# Patient Record
Sex: FEMALE | Race: ASIAN | NOT HISPANIC OR LATINO | URBAN - METROPOLITAN AREA
[De-identification: names, ages, dates, MRNs, and addresses within clinical notes are randomized per-mention and may not be internally consistent; named-entity substitution may affect disease eponyms.]

---

## 2024-09-22 ENCOUNTER — EMERGENCY (EMERGENCY)
Facility: HOSPITAL | Age: 24
LOS: 0 days | Discharge: HOME | End: 2024-09-22
Attending: EMERGENCY MEDICINE
Payer: COMMERCIAL

## 2024-09-22 VITALS
OXYGEN SATURATION: 99 % | HEART RATE: 117 BPM | HEIGHT: 67 IN | WEIGHT: 147.71 LBS | SYSTOLIC BLOOD PRESSURE: 146 MMHG | RESPIRATION RATE: 16 BRPM | TEMPERATURE: 98 F | DIASTOLIC BLOOD PRESSURE: 68 MMHG
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
## 2024-09-22 DIAGNOSIS — S06.0X0A CONCUSSION WITHOUT LOSS OF CONSCIOUSNESS, INITIAL ENCOUNTER: ICD-10-CM

## 2024-09-22 DIAGNOSIS — R51.9 HEADACHE, UNSPECIFIED: ICD-10-CM
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
## 2024-09-22 DIAGNOSIS — S70.11XA CONTUSION OF RIGHT THIGH, INITIAL ENCOUNTER: ICD-10-CM

## 2024-09-22 PROCEDURE — 99284 EMERGENCY DEPT VISIT MOD MDM: CPT

## 2024-09-22 RX ORDER — ACETAMINOPHEN 325 MG/1
650 TABLET ORAL ONCE
Refills: 0 | Status: COMPLETED | OUTPATIENT
Start: 2024-09-22 | End: 2024-09-22

## 2024-09-22 RX ORDER — IBUPROFEN 600 MG
600 TABLET ORAL ONCE
Refills: 0 | Status: COMPLETED | OUTPATIENT
Start: 2024-09-22 | End: 2024-09-22

## 2024-09-22 RX ADMIN — Medication 600 MILLIGRAM(S): at 16:26

## 2024-09-22 RX ADMIN — Medication 600 MILLIGRAM(S): at 16:39

## 2024-09-22 RX ADMIN — ACETAMINOPHEN 650 MILLIGRAM(S): 325 TABLET ORAL at 16:39

## 2024-09-22 RX ADMIN — ACETAMINOPHEN 650 MILLIGRAM(S): 325 TABLET ORAL at 16:26

## 2024-09-22 NOTE — ED PROVIDER NOTE - PATIENT PORTAL LINK FT
You can access the FollowMyHealth Patient Portal offered by [facility] by registering at the following website: [email or web address]. By joining [facility]’s FollowMyHealth portal, you will also be able to view your health information using other applications (apps) compatible with our system.

## 2024-09-22 NOTE — ED ADULT NURSE NOTE - CHPI ED NUR SYMPTOMS NEG
no abrasion/no back pain/no blurred vision/no change in level of consciousness/no chest pain/no chest wall tenderness/no loss of consciousness/no seizure/no vomiting/no weakness
Home

## 2024-09-22 NOTE — ED ADULT NURSE NOTE - NSFALLUNIVINTERV_ED_ALL_ED
Bed/Stretcher in lowest position, wheels locked, appropriate side rails in place/Call bell, personal items and telephone in reach/Instruct patient to call for assistance before getting out of bed/chair/stretcher/Non-slip footwear applied when patient is off stretcher/Mayer to call system/Physically safe environment - no spills, clutter or unnecessary equipment/Purposeful proactive rounding/Room/bathroom lighting operational, light cord in reach
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
## 2024-09-22 NOTE — ED PROVIDER NOTE - NSFOLLOWUPCLINICS_GEN_ALL_ED_FT
[facility]  Neurology  [address] [place] [address]  Phone: [phone]  Fax: [phone]    [facility]  Neurology  [address] [place] [id number]  Phone: [phone]  Fax:     [facility]  Neurology  [address] [place] [address]  Phone: [phone]  Fax: [phone]
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
## 2024-09-22 NOTE — ED PROVIDER NOTE - CHPI ED SYMPTOMS NEG
no back pain/no blurred vision/no chest pain/no loss of consciousness/no seizure/no vomiting/no weakness

## 2024-09-22 NOTE — ED PROVIDER NOTE - SKIN, MLM
5
Skin normal color for race, warm, dry and intact. There is a bruise above the right eye and on the inner right thigh. There is a small hematoma on the left parietal scalp.
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
## 2025-07-09 ENCOUNTER — EMERGENCY (EMERGENCY)
Facility: HOSPITAL | Age: 25
LOS: 0 days | Discharge: HOME | End: 2025-07-09
Attending: STUDENT IN AN ORGANIZED HEALTH CARE EDUCATION/TRAINING PROGRAM
Payer: COMMERCIAL

## 2025-07-09 VITALS — HEIGHT: 68 IN | OXYGEN SATURATION: 99 % | HEART RATE: 74 BPM | WEIGHT: 147.71 LBS | RESPIRATION RATE: 18 BRPM

## 2025-07-09 VITALS
OXYGEN SATURATION: 100 % | DIASTOLIC BLOOD PRESSURE: 79 MMHG | RESPIRATION RATE: 18 BRPM | HEART RATE: 90 BPM | SYSTOLIC BLOOD PRESSURE: 130 MMHG

## 2025-07-09 DIAGNOSIS — Z23 ENCOUNTER FOR IMMUNIZATION: ICD-10-CM

## 2025-07-09 DIAGNOSIS — K08.89 OTHER SPECIFIED DISORDERS OF TEETH AND SUPPORTING STRUCTURES: ICD-10-CM
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
## 2025-07-09 PROCEDURE — 99284 EMERGENCY DEPT VISIT MOD MDM: CPT

## 2025-07-09 RX ORDER — AMOXICILLIN AND CLAVULANATE POTASSIUM 500; 125 MG/1; MG/1
1 TABLET, FILM COATED ORAL ONCE
Refills: 0 | Status: COMPLETED | OUTPATIENT
Start: 2025-07-09 | End: 2025-07-09

## 2025-07-09 RX ADMIN — AMOXICILLIN AND CLAVULANATE POTASSIUM 1 TABLET(S): 500; 125 TABLET, FILM COATED ORAL at 22:24

## 2025-07-09 NOTE — ED ADULT TRIAGE NOTE - CHIEF COMPLAINT QUOTE
Patient reports "I was chasing my dog and fell. I cut my lip. 2 cm  laceration to left upper lip. No active bleeding. Denies loc. Last tetanus between 10-11 years ago. Pain 5-9/10  PMH: denies

## 2025-07-09 NOTE — ED PROVIDER NOTE - PATIENT PORTAL LINK FT
You can access the FollowMyHealth Patient Portal offered by [facility] by registering at the following website: [email or web address]. By joining [facility]’s FollowMyHealth portal, you will also be able to view your health information using other applications (apps) compatible with our system.

## 2025-07-09 NOTE — ED PROVIDER NOTE - CLINICAL SUMMARY MEDICAL DECISION MAKING FREE TEXT BOX
26 yo female with left upper lip laceration sustained after trip and fall today. No LOC. Patient requesting plastics for repair.     Discussed case with Dr. Kaminski, he cannot come tonight offered to have patient return on Thursday at 11am for repair, will give PO ABX as per Dr. Kaminski and Pt is well appearing walking with steady gait, stable for discharge and follow up without fail with medical doctor. I had a detailed discussion with the patient and/or guardian regarding the historical points, exam findings, and any diagnostic results supporting the discharge diagnosis. Pt educated on care and need for follow up. Strict return instructions and red flag signs and symptoms discussed with patient. Questions answered. Pt shows understanding of discharge information and agrees to follow.

## 2025-07-09 NOTE — ED PROVIDER NOTE - NSFOLLOWUPINSTRUCTIONS_ED_ALL_ED_FT
A lip laceration is a cut or tear in the lip, which can range from a minor injury to a deep wound requiring stitches. Here's some information about lip lacerations:    Causes:    Trauma: Falls, sports injuries, blunt force to the face (e.g., punches, accidents), biting the lip.  Sharp objects: Contact with glass, knives, or other sharp materials.  Dental procedures: Accidental cuts during dental work.  Symptoms:    Bleeding: The lips have a rich blood supply, so even small cuts can bleed significantly.  Pain: The lips are very sensitive, so lacerations can be quite painful.  Swelling: Trauma can cause the lip tissue to swell.  Difficulty speaking or eating: Depending on the location and severity of the laceration, it may be difficult to speak or eat normally.  Visible cut or tear: The laceration may be a superficial cut or a deep gash.  Diagnosis:    A doctor can usually diagnose a lip laceration through a simple physical examination. They'll assess the depth and extent of the injury to determine the appropriate treatment.    Treatment:    Treatment for a lip laceration depends on the severity:    Minor cuts: Minor lacerations that are shallow and not bleeding heavily may heal on their own with proper cleaning and care. Direct pressure can help stop bleeding. Rinsing with an antiseptic mouthwash can help prevent infection.  Deeper cuts: Deeper lacerations that bleed heavily, are gaping open, or involve the lip border (vermilion border) often require stitches (sutures). Stitches help control bleeding, promote proper healing, and minimize scarring. Absorbable sutures are often used inside the mouth, while non-absorbable sutures may be used on the outer skin. These are typically removed after a few days.  Severe lacerations: Severe lacerations that involve significant tissue loss or damage to underlying structures may require more complex surgical repair.  First Aid for Lip Lacerations:    Control bleeding: Apply direct pressure to the wound using a clean cloth.  Clean the wound: Gently rinse the wound with clean water or saline solution. Avoid using harsh antiseptic solutions inside the mouth.  Ice the area: Apply a cold compress or ice pack wrapped in a thin cloth to the area to reduce swelling and pain.  Seek medical attention: If the bleeding is heavy, the cut is deep, or the lip appears deformed, seek medical attention immediately.  Possible Complications:    Infection: If the wound isn't properly cleaned, it can become infected.  Scarring: Lip lacerations, especially deeper ones, can result in noticeable scarring.  Difficulty with lip function: Severe lacerations can sometimes affect the ability to speak, eat, or close the lips properly.

## 2025-07-09 NOTE — ED ADULT NURSE NOTE - OBJECTIVE STATEMENT
Pt states she trip down the stairs and hit her lip chasing her dog. Pt has a laceration on the Upper lip. Site is bloody and red. No LOC, No trauma to the head. Denies dizziness, headache, disorientation.

## 2025-07-09 NOTE — ED ADULT NURSE NOTE - NS_ED_NURSE_TEACHING_TOPIC_ED_A_ED
f/u with plastic surgeon, monitor for s/s of infection. Return to ED for any new or worsening symptoms

## 2025-07-09 NOTE — ED PROVIDER NOTE - ATTENDING APP SHARED VISIT CONTRIBUTION OF CARE
HPI:    - ChiefConcern: Laceration on left side of lip after tripping and falling up stairs    - Past Medical Hx:    - Past Surgical Hx:    - Social Hx:    - Family Hx:    - Review of Systems:      - Positive for dental pain      - Negative for head injury      - Negative for other injuries or pain    - DurationOfSymptoms: Injury occurred today    - OnsetOfSymptoms: Patient tripped and fell up stairs    - PastOccurrence: No mention of similar past occurrences    - DiscussedPlanOfCare: Discussed cleaning the wound, potential for stitches, tetanus shot   Objective:    - VitalSigns:    - Physical Exam:      - General: Alert and oriented, no acute distress      - HEENT: Laceration noted on left side of lip      - Dental: Teeth appear solid, no looseness appreciated on examination      - Skin: No visible lesions or ulcers noted apart from lip laceration      - Neuro: No focal deficits observed     ExternalRecordsReviewed:     ExternalHistorySource:     IndependentInterpretation:     Consultations:     MedicalDecisionMaking:    -  Patient presents with a laceration on the left side of the lip following a fall up stairs. No other injuries reported or observed. Patient complains of dental pain, but examination reveals teeth are solid without apparent looseness.    -  Low suspicion for fracture based on physical examination.    -  Plan includes wound cleaning and likely closure with 1-2 sutures. Discussed potential for scarring with patient, noting that a small scar is likely.    -  Tetanus prophylaxis to be administered. Will reassess after wound cleaning to determine final treatment plan. HPI:    - ChiefConcern: Laceration on left side of lip after tripping and falling up stairs    - Past Medical Hx:    - Past Surgical Hx:    - Social Hx:    - Family Hx:    - Review of Systems:      - Positive for dental pain      - Negative for head injury      - Negative for other injuries or pain    - DurationOfSymptoms: Injury occurred today    - OnsetOfSymptoms: Patient tripped and fell up stairs    - PastOccurrence: No mention of similar past occurrences    - DiscussedPlanOfCare: Discussed cleaning the wound, potential for stitches, tetanus shot   Objective:    - VitalSigns:    - Physical Exam:      - General: Alert and oriented, no acute distress      - HEENT: Laceration noted on left side of lip      - Dental: Teeth appear solid, no looseness appreciated on examination      - Skin: No visible lesions or ulcers noted apart from lip laceration      - Neuro: No focal deficits observed     ExternalRecordsReviewed:     ExternalHistorySource:     IndependentInterpretation:     Consultations:     MedicalDecisionMaking:    -  Patient presents with a laceration on the left side of the lip following a fall up stairs. No other injuries reported or observed. Patient complains of dental pain, but examination reveals teeth are solid without apparent looseness.    -  Low suspicion for fracture based on physical examination.    -  Plan includes wound cleaning and likely closure with 1-2 sutures. Discussed potential for scarring with patient, noting that a small scar is likely.    -  Tetanus prophylaxis to be administered. Will reassess after wound cleaning to determine final treatment plan.      This patient is requesting plastic surgery.  NP discussed this case with plastic surgery.  Plastic surgery states that they are unable to see the patient tonight.  They recommend the patient be given Augmentin.  They state that the patient wishes to come back to the emergency department tomorrow after 11 AM, they will be more available to see the patient in suture the wound closed.  We offered the patient closure via our team in the emergency department, however the patient declined.  She states that she would like to return tomorrow to have plastic surgery close the lip wound.  At this time, the patient will be discharged to home.

## 2025-07-10 ENCOUNTER — EMERGENCY (EMERGENCY)
Facility: HOSPITAL | Age: 25
LOS: 0 days | Discharge: HOME | End: 2025-07-10
Attending: EMERGENCY MEDICINE

## 2025-07-10 VITALS
DIASTOLIC BLOOD PRESSURE: 71 MMHG | OXYGEN SATURATION: 98 % | HEART RATE: 82 BPM | SYSTOLIC BLOOD PRESSURE: 111 MMHG | TEMPERATURE: 98 F | RESPIRATION RATE: 19 BRPM

## 2025-07-10 VITALS — HEIGHT: 68 IN | HEART RATE: 80 BPM | OXYGEN SATURATION: 99 %
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
## 2025-07-10 DIAGNOSIS — S01.511A LACERATION WITHOUT FOREIGN BODY OF LIP, INITIAL ENCOUNTER: ICD-10-CM

## 2025-07-10 DIAGNOSIS — S01.81XA LACERATION WITHOUT FOREIGN BODY OF OTHER PART OF HEAD, INITIAL ENCOUNTER: ICD-10-CM
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
## 2025-07-10 PROBLEM — Z78.9 OTHER SPECIFIED HEALTH STATUS: Chronic | Status: ACTIVE | Noted: 2024-09-22

## 2025-07-10 PROCEDURE — 99284 EMERGENCY DEPT VISIT MOD MDM: CPT

## 2025-07-10 RX ORDER — ACETAMINOPHEN 500 MG/5ML
975 LIQUID (ML) ORAL ONCE
Refills: 0 | Status: COMPLETED | OUTPATIENT
Start: 2025-07-10 | End: 2025-07-10

## 2025-07-10 RX ORDER — AMOXICILLIN AND CLAVULANATE POTASSIUM 500; 125 MG/1; MG/1
1 TABLET, FILM COATED ORAL
Qty: 10 | Refills: 0
Start: 2025-07-10 | End: 2025-07-14

## 2025-07-10 RX ORDER — AMOXICILLIN AND CLAVULANATE POTASSIUM 500; 125 MG/1; MG/1
1 TABLET, FILM COATED ORAL ONCE
Refills: 0 | Status: COMPLETED | OUTPATIENT
Start: 2025-07-10 | End: 2025-07-10

## 2025-07-10 RX ADMIN — AMOXICILLIN AND CLAVULANATE POTASSIUM 1 TABLET(S): 500; 125 TABLET, FILM COATED ORAL at 11:14

## 2025-07-10 RX ADMIN — Medication 975 MILLIGRAM(S): at 11:14

## 2025-07-10 NOTE — ED PROVIDER NOTE - PATIENT PORTAL LINK FT
You can access the FollowMyHealth Patient Portal offered by [facility] by registering at the following website: [email or web address]. By joining [employer]’s FollowMyHealth portal, you will also be able to view your health information using other applications (apps) compatible with our system.

## 2025-07-10 NOTE — ED PROVIDER NOTE - NSFOLLOWUPINSTRUCTIONS_ED_ALL_ED_FT
- You were seen in the ER today for a wound which required repair with sutures.    -Keep sutures covered and dry for 24 hours then you can clean gently with soap and tepid water daily.     - A prescription was sent to your pharmacy for Augmentin, an antibiotic, please take this twice daily for the next 5 days.    - You can take Tylenol 650 mg every 6 hours as needed for pain.    - Please follow-up with Dr. Kaminski, the plastic surgeon, as discussed.    -Please return immediately to the Emergency Department if you have any pain, redness, streaking (red lines), swelling, fever, chills, or any other concerns.    - Avoid scarring by avoiding direct sun exposure to wound. Wear a hat to protect wound on the face from the sun, long sleeved shirts and pants for wounds on the arms or the legs. Once the wound is healed completely you can apply sunscreen to the wound to protect it from the sun. You can use over the counter Vitamin E gel to reduce the appearance of wounds.

## 2025-07-10 NOTE — ED ADULT TRIAGE NOTE - CHIEF COMPLAINT QUOTE
s/p fall yesterday injured upper left lip told to come back to see DR. Kaminski for sutures no pmh no LOC no fevers or pain no bleeding at this time not taking anti-coagulant's

## 2025-07-10 NOTE — ED PROVIDER NOTE - PROGRESS NOTE DETAILS
HAWK Menjivar: Patient received in results waiting at 1239 time of disposition. Patient is A&Ox3, presented to ER with complaint of lip laceration, sent in by plastic surgeon Dr. Kaminski for repair.  Patient was seen and evaluated by intake MD Torrez.  In RW lack repaired by Dr. Kaminski.  Patient structured to take over-the-counter Tylenol and ibuprofen as needed for pain.  Patient provided strict return precautions, provided time to ask questions which were answered at the bedside by me.  Patient is ready for discharge home.  Patient instructed to follow-up with Dr. Kaminski.

## 2025-07-10 NOTE — ED PROVIDER NOTE - ATTENDING APP SHARED VISIT CONTRIBUTION OF CARE
Attending note (Shan): Patient seen and evaluated initially by myself, with the my care plan instituted by the advanced care practitioner as detailed above in the medical decision making section. See MDM or progress notes sections for updates to this care plan.  I have reviewed and agree with any additional documentation by HAWK Menjivar.  Patient presenting with lip laceration; appreciate plastic surgeon consulting and repairing; I agree with the discharge plan as documented above.

## 2025-07-10 NOTE — ED PROVIDER NOTE - CARE PROVIDER_API CALL
Wero Kaminski  Plastic Surgery  [address] [place] [address], [address]  [place] [address]  Phone: [phone]  Fax: [phone]  Follow Up Time: Routine

## 2025-07-10 NOTE — ED PROVIDER NOTE - CLINICAL SUMMARY MEDICAL DECISION MAKING FREE TEXT BOX
Attending note (Shan): Assessment and Plan:    - Problem 1: Facial Laceration    - Assessment/Plan: I assess this as a facial laceration resulting from a fall. Given the mechanism of injury (strike to the ground) and the delay in closure, there is a higher risk for infection.      - Delayed closure planned, to be performed by Dr. Dean who is expected to arrive shortly       - Antibiotic therapy for (augmentin for 5 days) to prevent infection       - Follow-up with plastics

## 2025-07-10 NOTE — ED ADULT NURSE NOTE - OBJECTIVE STATEMENT
patient was sent by dr perera for upper lip lac after a fall, patient states she was chasing her dog, tripped and fell hitting lip on the edge of the stairs, no loc noted

## 2025-07-10 NOTE — ED PROVIDER NOTE - DATE/TIME 1
Pt called back-advised that as soon as Dr Delcid had final report on testing we would call her. Pt understandable.    10-Jul-2025 12:39

## 2025-07-10 NOTE — ED PROVIDER NOTE - OBJECTIVE STATEMENT
Attending note (Shan): - History of Present Illness: 25-year-old female with no reported medical comorbidities presents for follow-up of a facial laceration sustained yesterday while shaving their dog. The patient fell and struck the ground, resulting in the injury.  Return to the ED today for scheduled follow-up with plastic surgery and repair/delayed closure of left upper lip laceration.  No jaw or dental pain reported.  No other injuries reported.  Received tetanus vaccination last night    - History: No known allergies reported. No other relevant medical, surgical, or social history provided in the conversation.

## 2025-07-10 NOTE — ED ADULT NURSE NOTE - NSFALLRISKINTERV_ED_ALL_ED

## 2025-07-10 NOTE — ED PROVIDER NOTE - PHYSICAL EXAMINATION
On Physical Exam:  General: well appearing, in NAD, speaking clearly in full sentences and without difficulty; cooperative with exam  HEENT: PERRL, MMM, oropharynx clear no gross dental injuries/fractures noted, no blood in oropharynx.  -Lip: left upper lip ~1cm vertical lip laceration with vermillion border involvement not appearing through/through